# Patient Record
(demographics unavailable — no encounter records)

---

## 2024-11-18 NOTE — PHYSICAL EXAM
[TextEntry] :  Constitutional: alert and no acute distress. Well developed Eyes: no proptosis and no lid lag  Neck: mild thyromegaly Pulmonary: no respiratory distress, no accessory muscle use  Cardiac: RRR, non-tachycardic,  Vascular: no peripheral edema   Abdomen: nondistended Back: no spinal tenderness, no scoliosis Endocrine: no stigmata of Cushings Syndrome  Musculoskeletal: normal gait and no clubbing or cyanosis of the fingernails  Neurology:   no tremors  Psychiatric: oriented to person, place, and time and insight and judgment were intact  Skin: no rash, no acne

## 2024-11-18 NOTE — HISTORY OF PRESENT ILLNESS
[FreeTextEntry1] : 53 yr old female with PMH of HTN and prediabetes presents for initial evaluation of hyperthyroidism  Patient was hospitalized 10/4/24 with chief complaint of left sided chest pain and dizziness for 4 days. Pt was admitted for cardiac workup including echo and stress test. ECG and normal biomarkers were reassuring. Normal echocardiography and stress testing with myocardial perfusion imaging were also reassuring. On blood work, she was found to have TSH <0.1. Subsequent labs showed T3 111- wnl, fT4 1.2- wnl, TSI 7.5 (H). Patient was discharged with PTU 50 mg TID  Regarding hx of hyperthyroidism: Patient states she was first diagnosed with hyperthyroidism in 2019 in [facility] and started on MMI, but stopped due to SOB/malaise/feeling "off."  Then was on PTU, which was dc'd in 2022 when was told TFT was low.   Patient denies hyperthyroid s/s, including palpitations, diaphoresis, heat intolerance, loose stools or shakiness. 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

## 2024-11-18 NOTE — REVIEW OF SYSTEMS
[Chest Pain] : no chest pain [Palpitations] : no palpitations [Shortness Of Breath] : no shortness of breath [Nausea] : no nausea [Vomiting] : no vomiting

## 2024-11-18 NOTE — HISTORY OF PRESENT ILLNESS
[FreeTextEntry1] : 53 yr old female with PMH of HTN and prediabetes presents for initial evaluation of hyperthyroidism  Patient was hospitalized 10/4/24 with chief complaint of left sided chest pain and dizziness for 4 days. Pt was admitted for cardiac workup including echo and stress test. ECG and normal biomarkers were reassuring. Normal echocardiography and stress testing with myocardial perfusion imaging were also reassuring. On blood work, she was found to have TSH <0.1. Subsequent labs showed T3 111- wnl, fT4 1.2- wnl, TSI 7.5 (H). Patient was discharged with PTU 50 mg TID  Regarding hx of hyperthyroidism: Patient states she was first diagnosed with hyperthyroidism in 2019 in [facility] and started on MMI, but stopped due to SOB/malaise/feeling "off."  Then was on PTU, which was dc'd in 2022 when was told TFT was low.   Patient denies hyperthyroid s/s, including palpitations, diaphoresis, heat intolerance, loose stools or shakiness. 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

## 2024-11-18 NOTE — ASSESSMENT
[FreeTextEntry1] : 53 yr old female with PMH of HTN and prediabetes presents for initial evaluation of hyperthyroidism  #Subclinical hyperthyroidism due to graves' disease Clinically appears euthyroid Plan: - repeat TSI, free T4, T3, TSH, TRAB today - recommend continue PTU 50 mg BID pending lab results - discussed definitive tx options of thyroidectomy vs COBB however patient prefers to continue tx with PTU at this time - She was cautioned re the risks of agranulocytosis/neutropenia or liver toxicity.  She was told that if she develops fever, sore throat or sores in the mouth she is to immediately contact us or her primary care provider to check her white blood cell count.  She was told that if she develops nausea, vomiting or scleral icterus she is to immediately contact us or her primary care provider to check her liver function. She was told that if she develops these side effects and the drug is discontinued, the problem normally recovers within a few days without additional therapy but that if the drug is continued she can develop serious, even life threatening, infection or liver failure.  #Prediabetes - check lipid panel, HbA1c and urine microalbumin today - counseled on lifestyle modifications, healthy diet and 150 minutes weekly exercise   #Essential HTN with goal BP<140/80 BP well controlled today- 136/86, off medications - continue to monitor, advised f/u with cardiology  RTC in 3 months

## 2025-03-18 NOTE — HISTORY OF PRESENT ILLNESS
[FreeTextEntry1] : 54 yr old female with PMH of HTN and prediabetes presents for follow up evaluation of hyperthyroidism  Patient was hospitalized 10/4/24 with chief complaint of left sided chest pain and dizziness for 4 days. Pt was admitted for cardiac workup including echo and stress test. ECG and normal biomarkers were reassuring. Normal echocardiography and stress testing with myocardial perfusion imaging were also reassuring. On blood work, she was found to have TSH <0.1. Subsequent labs showed T3 111- wnl, fT4 1.2- wnl, TSI 7.5 (H). Patient was discharged with PTU 50 mg TID  Regarding hx of hyperthyroidism: Patient states she was first diagnosed with hyperthyroidism in 2019 in [facility] and started on MMI, but stopped due to SOB/malaise/feeling "off."  Then was on PTU, which was dc'd in 2022 when was told TFT was low.  Then restarted PTU after 10/2024 admission- 50 mg TID Last visit 11/2024-> adjusted PTU dose to 50 mg BID, labs from 11/2024 showed TSH 2.15, fT4 0.7 (L), and T3 87 wnl, and high TSI 5.8 and high TSH receptor Ab 5.37  Currently she has been taking PTU 50 mg BID Patient denies hyperthyroid s/s, including palpitations, diaphoresis, heat intolerance, loose stools or shakiness. Weight loss intentionally- weight loss of 18 lbs since 11/2024 through dietary changes Energy levels are good Eye symptoms: denies redness, tearing, double vision, eye itching Reports dry eyes, reports vision was affected after pregnancy as she had pre-eclampsia and started requiring glasses after pregnancy  Hx of HTN: BP today 130/72 She ran out of amlodipine 10 mg daily a few weeks ago, asking for renewal today. 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

## 2025-03-18 NOTE — HISTORY OF PRESENT ILLNESS
[FreeTextEntry1] : 54 yr old female with PMH of HTN and prediabetes presents for follow up evaluation of hyperthyroidism  Patient was hospitalized 10/4/24 with chief complaint of left sided chest pain and dizziness for 4 days. Pt was admitted for cardiac workup including echo and stress test. ECG and normal biomarkers were reassuring. Normal echocardiography and stress testing with myocardial perfusion imaging were also reassuring. On blood work, she was found to have TSH <0.1. Subsequent labs showed T3 111- wnl, fT4 1.2- wnl, TSI 7.5 (H). Patient was discharged with PTU 50 mg TID  Regarding hx of hyperthyroidism: Patient states she was first diagnosed with hyperthyroidism in 2019 in [facility] and started on MMI, but stopped due to SOB/malaise/feeling "off."  Then was on PTU, which was dc'd in 2022 when was told TFT was low.  Then restarted PTU after 10/2024 admission- 50 mg TID Last visit 11/2024-> adjusted PTU dose to 50 mg BID, labs from 11/2024 showed TSH 2.15, fT4 0.7 (L), and T3 87 wnl, and high TSI 5.8 and high TSH receptor Ab 5.37  Currently she has been taking PTU 50 mg BID Patient denies hyperthyroid s/s, including palpitations, diaphoresis, heat intolerance, loose stools or shakiness. Weight loss intentionally- weight loss of 18 lbs since 11/2024 through dietary changes Energy levels are good Eye symptoms: denies redness, tearing, double vision, eye itching Reports dry eyes, reports vision was affected after pregnancy as she had pre-eclampsia and started requiring glasses after pregnancy  Hx of HTN: BP today 130/72 She ran out of amlodipine 10 mg daily a few weeks ago, asking for renewal today. 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

## 2025-03-18 NOTE — ASSESSMENT
[FreeTextEntry1] : 54 yr old female with PMH of HTN and prediabetes presents for follow up evaluation of hyperthyroidism  #Subclinical hyperthyroidism due to graves' disease Clinically appears euthyroid Plan: - repeat TSI, free T4, T3, TSH, TRAB today - recommend continue PTU 50 mg BID pending lab results - discussed definitive tx options of thyroidectomy vs COBB however patient prefers to continue tx with PTU at this time - She was cautioned re the risks of agranulocytosis/neutropenia or liver toxicity.  She was told that if she develops fever, sore throat or sores in the mouth she is to immediately contact us or her primary care provider to check her white blood cell count.  She was told that if she develops nausea, vomiting or scleral icterus she is to immediately contact us or her primary care provider to check her liver function. She was told that if she develops these side effects and the drug is discontinued, the problem normally recovers within a few days without additional therapy but that if the drug is continued she can develop serious, even life threatening, infection or liver failure.  #Prediabetes with mild albuminuria - repeat A1c today - counseled on lifestyle modifications, healthy diet and 150 minutes weekly exercise   #HLD Lipid panel from 11/2024: chol 193, TG 47, HDL 60,  (H) Counseled on low-fat diet, lifestyle modifications, increased exercise- 150 minutes weekly goal  #Essential HTN with goal BP<140/80 BP well controlled today, 130/72 - renewed amlodipine 10 mg daily per pt request  RTC in 3 months

## 2025-06-15 NOTE — ASSESSMENT
[FreeTextEntry1] : 54 yr old female with PMH of HTN and prediabetes presents for follow up evaluation of hyperthyroidism  #Subclinical hyperthyroidism due to graves' disease Clinically appears euthyroid Plan: - repeat TSI, free T4, T3, TSH, TRAB today - recommend continue PTU 50 mg daily pending lab results - discussed definitive tx options of thyroidectomy vs COBB however patient prefers to continue tx with PTU at this time - She was cautioned re the risks of agranulocytosis/neutropenia or liver toxicity.  She was told that if she develops fever, sore throat or sores in the mouth she is to immediately contact us or her primary care provider to check her white blood cell count.  She was told that if she develops nausea, vomiting or scleral icterus she is to immediately contact us or her primary care provider to check her liver function. She was told that if she develops these side effects and the drug is discontinued, the problem normally recovers within a few days without additional therapy but that if the drug is continued she can develop serious, even life threatening, infection or liver failure.  #Prediabetes with mild albuminuria - repeat A1c today - counseled on lifestyle modifications, healthy diet and 150 minutes weekly exercise   #HLD Lipid panel from 11/2024: chol 193, TG 47, HDL 60,  (H) Counseled on low-fat diet, lifestyle modifications, increased exercise- 150 minutes weekly goal  #Essential HTN with goal BP<140/80 BP well controlled today, *** - continue amlodipine 10 mg daily   RTC in 3 months

## 2025-06-15 NOTE — HISTORY OF PRESENT ILLNESS
[FreeTextEntry1] : 54 yr old female with PMH of HTN and prediabetes presents for follow up evaluation of hyperthyroidism  Patient was hospitalized 10/4/24 with chief complaint of left sided chest pain and dizziness for 4 days. Pt was admitted for cardiac workup including echo and stress test. ECG and normal biomarkers were reassuring. Normal echocardiography and stress testing with myocardial perfusion imaging were also reassuring. On blood work, she was found to have TSH <0.1. Subsequent labs showed T3 111- wnl, fT4 1.2- wnl, TSI 7.5 (H). Patient was discharged with PTU 50 mg TID  Regarding hx of hyperthyroidism: Patient states she was first diagnosed with hyperthyroidism in 2019 in [facility] and started on MMI, but stopped due to SOB/malaise/feeling "off."  Then was on PTU, which was dc'd in 2022 when was told TFT was low.  Then restarted PTU after 10/2024 admission- 50 mg TID  Clinic visit 11/2024-> adjusted PTU dose to 50 mg BID, labs from 11/2024 showed TSH 2.15, fT4 0.7 (L), and T3 87 wnl, and high TSI 5.8 and high TSH receptor Ab 5.37  Clinic visit 3/2025 -> adjusted PTU dose from 50 mg BID to 50 mg daily, labs from 3/18/25 showed TSH 4.46 (H), T3 120 wnl, FT4 0.9 wnl and TSH receptor Ab 3.66 (H)  Had advised patient to complete repeat thyroid labs in May- she has not yet completed these, will plan to recheck thyroid levels today  Currently she has been taking PTU 50 mg daily Patient denies hyperthyroid s/s, including palpitations, diaphoresis, heat intolerance, loose stools or shakiness. Weight loss intentionally- weight loss of 18 lbs since 11/2024 through dietary changes Energy levels are good Eye symptoms: denies redness, tearing, double vision, eye itching Reports dry eyes, reports vision was affected after pregnancy as she had pre-eclampsia and started requiring glasses after pregnancy  Hx of HTN: BP today *** Taking amlodipine 10 mg daily   #Hx of prediabetes: A1c trend:  A1c 5.8% 3/2025 A1c 5.8% in 11/2024  Diet: avoids juice, drinks water breakfast- cornmeal, fruits dinner- vegetables, plantain, meat or fish, rice Physical activity: exercise through work  Monitoring for Complications: UACR 37 (H) on 11/2024 Denies neuropathy Denies retinopathy, Last ophthalmology exam: last year in 2024 No hx of foot ulcer  Denies fhx of thyroid disease  Social hx: Denies hx of smoking 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

## 2025-06-15 NOTE — HISTORY OF PRESENT ILLNESS
[FreeTextEntry1] : 54 yr old female with PMH of HTN and prediabetes presents for follow up evaluation of hyperthyroidism  Patient was hospitalized 10/4/24 with chief complaint of left sided chest pain and dizziness for 4 days. Pt was admitted for cardiac workup including echo and stress test. ECG and normal biomarkers were reassuring. Normal echocardiography and stress testing with myocardial perfusion imaging were also reassuring. On blood work, she was found to have TSH <0.1. Subsequent labs showed T3 111- wnl, fT4 1.2- wnl, TSI 7.5 (H). Patient was discharged with PTU 50 mg TID  Regarding hx of hyperthyroidism: Patient states she was first diagnosed with hyperthyroidism in 2019 in [facility] and started on MMI, but stopped due to SOB/malaise/feeling "off."  Then was on PTU, which was dc'd in 2022 when was told TFT was low.  Then restarted PTU after 10/2024 admission- 50 mg TID  Clinic visit 11/2024-> adjusted PTU dose to 50 mg BID, labs from 11/2024 showed TSH 2.15, fT4 0.7 (L), and T3 87 wnl, and high TSI 5.8 and high TSH receptor Ab 5.37  Clinic visit 3/2025 -> adjusted PTU dose from 50 mg BID to 50 mg daily, labs from 3/18/25 showed TSH 4.46 (H), T3 120 wnl, FT4 0.9 wnl and TSH receptor Ab 3.66 (H)  Had advised patient to complete repeat thyroid labs in May- she has not yet completed these, will plan to recheck thyroid levels today  Currently she has been taking PTU 50 mg daily Patient denies hyperthyroid s/s, including palpitations, diaphoresis, heat intolerance, loose stools or shakiness. Weight loss intentionally- weight loss of 18 lbs since 11/2024 through dietary changes Energy levels are good Eye symptoms: denies redness, tearing, double vision, eye itching Reports dry eyes, reports vision was affected after pregnancy as she had pre-eclampsia and started requiring glasses after pregnancy  Hx of HTN: BP today *** Taking amlodipine 10 mg daily   #Hx of prediabetes: A1c trend:  A1c 5.8% 3/2025 A1c 5.8% in 11/2024  Diet: avoids juice, drinks water breakfast- cornmeal, fruits dinner- vegetables, plantain, meat or fish, rice Physical activity: exercise through work  Monitoring for Complications: UACR 37 (H) on 11/2024 Denies neuropathy Denies retinopathy, Last ophthalmology exam: last year in 2024 No hx of foot ulcer  Denies fhx of thyroid disease  Social hx: Denies hx of smoking 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